# Patient Record
Sex: FEMALE | Race: WHITE
[De-identification: names, ages, dates, MRNs, and addresses within clinical notes are randomized per-mention and may not be internally consistent; named-entity substitution may affect disease eponyms.]

---

## 2017-04-01 ENCOUNTER — HOSPITAL ENCOUNTER (OUTPATIENT)
Dept: HOSPITAL 80 - FIMAGING | Age: 62
End: 2017-04-01
Attending: GENERAL ACUTE CARE HOSPITAL
Payer: COMMERCIAL

## 2017-04-01 DIAGNOSIS — Z12.31: Primary | ICD-10-CM

## 2017-04-01 PROCEDURE — G0202 SCR MAMMO BI INCL CAD: HCPCS

## 2017-04-19 ENCOUNTER — HOSPITAL ENCOUNTER (OUTPATIENT)
Dept: HOSPITAL 80 - FIMAGING | Age: 62
End: 2017-04-19
Attending: OBSTETRICS & GYNECOLOGY
Payer: COMMERCIAL

## 2017-04-19 DIAGNOSIS — N63: Primary | ICD-10-CM

## 2017-04-19 PROCEDURE — G0206 DX MAMMO INCL CAD UNI: HCPCS

## 2018-09-05 ENCOUNTER — HOSPITAL ENCOUNTER (OUTPATIENT)
Dept: HOSPITAL 80 - FIMAGING | Age: 63
End: 2018-09-05
Attending: OBSTETRICS & GYNECOLOGY
Payer: COMMERCIAL

## 2018-09-05 DIAGNOSIS — Z12.31: Primary | ICD-10-CM

## 2018-10-04 ENCOUNTER — HOSPITAL ENCOUNTER (EMERGENCY)
Dept: HOSPITAL 80 - FED | Age: 63
Discharge: HOME | End: 2018-10-04
Payer: COMMERCIAL

## 2018-10-04 VITALS — DIASTOLIC BLOOD PRESSURE: 89 MMHG | SYSTOLIC BLOOD PRESSURE: 123 MMHG

## 2018-10-04 DIAGNOSIS — M54.5: ICD-10-CM
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
## 2018-10-04 DIAGNOSIS — S39.012A: Primary | ICD-10-CM
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
## 2018-10-04 DIAGNOSIS — G89.29: ICD-10-CM
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
